# Patient Record
Sex: FEMALE | Race: WHITE | NOT HISPANIC OR LATINO | Employment: OTHER | URBAN - METROPOLITAN AREA
[De-identification: names, ages, dates, MRNs, and addresses within clinical notes are randomized per-mention and may not be internally consistent; named-entity substitution may affect disease eponyms.]

---

## 2017-11-30 ENCOUNTER — TRANSCRIBE ORDERS (OUTPATIENT)
Dept: ADMINISTRATIVE | Facility: HOSPITAL | Age: 67
End: 2017-11-30

## 2017-11-30 DIAGNOSIS — Z12.39 ENCOUNTER FOR SCREENING BREAST EXAMINATION: Primary | ICD-10-CM

## 2017-12-26 ENCOUNTER — APPOINTMENT (OUTPATIENT)
Dept: MAMMOGRAPHY | Facility: HOSPITAL | Age: 67
End: 2017-12-26

## 2018-02-01 ENCOUNTER — HOSPITAL ENCOUNTER (OUTPATIENT)
Dept: MAMMOGRAPHY | Facility: HOSPITAL | Age: 68
Discharge: HOME | End: 2018-02-01
Admitting: FAMILY MEDICINE

## 2018-02-01 DIAGNOSIS — Z12.39 ENCOUNTER FOR SCREENING BREAST EXAMINATION: ICD-10-CM

## 2018-02-01 PROCEDURE — 77067 SCR MAMMO BI INCL CAD: CPT

## 2018-03-05 ENCOUNTER — TRANSCRIBE ORDERS (OUTPATIENT)
Dept: ADMINISTRATIVE | Facility: HOSPITAL | Age: 68
End: 2018-03-05

## 2018-03-05 ENCOUNTER — HOSPITAL ENCOUNTER (OUTPATIENT)
Dept: GENERAL RADIOLOGY | Facility: HOSPITAL | Age: 68
Discharge: HOME | End: 2018-03-05
Admitting: FAMILY MEDICINE

## 2018-03-05 DIAGNOSIS — J69.0 ASPIRATION PNEUMONIA, UNSPECIFIED ASPIRATION PNEUMONIA TYPE, UNSPECIFIED LATERALITY, UNSPECIFIED PART OF LUNG (HCC): ICD-10-CM

## 2018-03-05 DIAGNOSIS — J69.0 ASPIRATION PNEUMONIA, UNSPECIFIED ASPIRATION PNEUMONIA TYPE, UNSPECIFIED LATERALITY, UNSPECIFIED PART OF LUNG (HCC): Primary | ICD-10-CM

## 2018-03-05 PROCEDURE — 71046 X-RAY EXAM CHEST 2 VIEWS: CPT

## 2018-08-22 ENCOUNTER — CONSULT (OUTPATIENT)
Dept: ONCOLOGY | Facility: CLINIC | Age: 68
End: 2018-08-22

## 2018-08-22 ENCOUNTER — LAB (OUTPATIENT)
Dept: LAB | Facility: HOSPITAL | Age: 68
End: 2018-08-22

## 2018-08-22 VITALS
RESPIRATION RATE: 16 BRPM | SYSTOLIC BLOOD PRESSURE: 138 MMHG | TEMPERATURE: 97.9 F | DIASTOLIC BLOOD PRESSURE: 70 MMHG | WEIGHT: 195.4 LBS | HEART RATE: 62 BPM | OXYGEN SATURATION: 96 % | BODY MASS INDEX: 32.55 KG/M2 | HEIGHT: 65 IN

## 2018-08-22 DIAGNOSIS — R79.89 ABNORMAL CBC: Primary | ICD-10-CM

## 2018-08-22 DIAGNOSIS — D64.9 ANEMIA, UNSPECIFIED TYPE: Primary | ICD-10-CM

## 2018-08-22 LAB
ALBUMIN SERPL-MCNC: 4 G/DL (ref 3.5–5.2)
ALBUMIN/GLOB SERPL: 1.3 G/DL (ref 1.1–2.4)
ALP SERPL-CCNC: 79 U/L (ref 38–116)
ALT SERPL W P-5'-P-CCNC: 9 U/L (ref 0–33)
ANION GAP SERPL CALCULATED.3IONS-SCNC: 13.2 MMOL/L
AST SERPL-CCNC: 16 U/L (ref 0–32)
BASOPHILS # BLD AUTO: 0.1 10*3/MM3 (ref 0–0.1)
BASOPHILS NFR BLD AUTO: 0.8 % (ref 0–1.1)
BILIRUB SERPL-MCNC: 0.2 MG/DL (ref 0.1–1.2)
BUN BLD-MCNC: 16 MG/DL (ref 6–20)
BUN/CREAT SERPL: 17.4 (ref 7.3–30)
CALCIUM SPEC-SCNC: 9.8 MG/DL (ref 8.5–10.2)
CHLORIDE SERPL-SCNC: 101 MMOL/L (ref 98–107)
CO2 SERPL-SCNC: 24.8 MMOL/L (ref 22–29)
CREAT BLD-MCNC: 0.92 MG/DL (ref 0.6–1.1)
DEPRECATED RDW RBC AUTO: 50.4 FL (ref 37–49)
EOSINOPHIL # BLD AUTO: 0.36 10*3/MM3 (ref 0–0.36)
EOSINOPHIL NFR BLD AUTO: 2.9 % (ref 1–5)
ERYTHROCYTE [DISTWIDTH] IN BLOOD BY AUTOMATED COUNT: 20.4 % (ref 11.7–14.5)
FERRITIN SERPL-MCNC: 7.1 NG/ML
FOLATE SERPL-MCNC: 19.43 NG/ML (ref 4.78–24.2)
GFR SERPL CREATININE-BSD FRML MDRD: 60 ML/MIN/1.73
GLOBULIN UR ELPH-MCNC: 3.2 GM/DL (ref 1.8–3.5)
GLUCOSE BLD-MCNC: 96 MG/DL (ref 74–124)
HAPTOGLOB SERPL-MCNC: 345 MG/DL (ref 30–200)
HCT VFR BLD AUTO: 29.7 % (ref 34–45)
HGB BLD-MCNC: 8.6 G/DL (ref 11.5–14.9)
IMM GRANULOCYTES # BLD: 0.07 10*3/MM3 (ref 0–0.03)
IMM GRANULOCYTES NFR BLD: 0.6 % (ref 0–0.5)
IRON 24H UR-MRATE: 14 MCG/DL (ref 37–145)
IRON SATN MFR SERPL: 3 % (ref 14–48)
LDH SERPL-CCNC: 178 U/L (ref 99–259)
LYMPHOCYTES # BLD AUTO: 1.93 10*3/MM3 (ref 1–3.5)
LYMPHOCYTES NFR BLD AUTO: 15.3 % (ref 20–49)
MCH RBC QN AUTO: 20 PG (ref 27–33)
MCHC RBC AUTO-ENTMCNC: 29 G/DL (ref 32–35)
MCV RBC AUTO: 69.2 FL (ref 83–97)
MONOCYTES # BLD AUTO: 1.03 10*3/MM3 (ref 0.25–0.8)
MONOCYTES NFR BLD AUTO: 8.2 % (ref 4–12)
NEUTROPHILS # BLD AUTO: 9.1 10*3/MM3 (ref 1.5–7)
NEUTROPHILS NFR BLD AUTO: 72.2 % (ref 39–75)
NRBC BLD MANUAL-RTO: 0 /100 WBC (ref 0–0)
PLATELET # BLD AUTO: 371 10*3/MM3 (ref 150–375)
PMV BLD AUTO: 9.2 FL (ref 8.9–12.1)
POTASSIUM BLD-SCNC: 4.5 MMOL/L (ref 3.5–4.7)
PROT SERPL-MCNC: 7.2 G/DL (ref 6.3–8)
RBC # BLD AUTO: 4.29 10*6/MM3 (ref 3.9–5)
SODIUM BLD-SCNC: 139 MMOL/L (ref 134–145)
TIBC SERPL-MCNC: 435 MCG/DL (ref 249–505)
TRANSFERRIN SERPL-MCNC: 311 MG/DL (ref 200–360)
VIT B12 BLD-MCNC: 966 PG/ML (ref 211–946)
WBC NRBC COR # BLD: 12.59 10*3/MM3 (ref 4–10)

## 2018-08-22 PROCEDURE — 80053 COMPREHEN METABOLIC PANEL: CPT | Performed by: INTERNAL MEDICINE

## 2018-08-22 PROCEDURE — 84466 ASSAY OF TRANSFERRIN: CPT | Performed by: INTERNAL MEDICINE

## 2018-08-22 PROCEDURE — 82607 VITAMIN B-12: CPT | Performed by: INTERNAL MEDICINE

## 2018-08-22 PROCEDURE — 83615 LACTATE (LD) (LDH) ENZYME: CPT | Performed by: INTERNAL MEDICINE

## 2018-08-22 PROCEDURE — 36415 COLL VENOUS BLD VENIPUNCTURE: CPT | Performed by: INTERNAL MEDICINE

## 2018-08-22 PROCEDURE — 83010 ASSAY OF HAPTOGLOBIN QUANT: CPT | Performed by: INTERNAL MEDICINE

## 2018-08-22 PROCEDURE — 82746 ASSAY OF FOLIC ACID SERUM: CPT | Performed by: INTERNAL MEDICINE

## 2018-08-22 PROCEDURE — 99205 OFFICE O/P NEW HI 60 MIN: CPT | Performed by: INTERNAL MEDICINE

## 2018-08-22 PROCEDURE — 83540 ASSAY OF IRON: CPT | Performed by: INTERNAL MEDICINE

## 2018-08-22 PROCEDURE — 85025 COMPLETE CBC W/AUTO DIFF WBC: CPT | Performed by: INTERNAL MEDICINE

## 2018-08-22 PROCEDURE — 82728 ASSAY OF FERRITIN: CPT | Performed by: INTERNAL MEDICINE

## 2018-08-22 RX ORDER — OMEPRAZOLE 20 MG/1
20 CAPSULE, DELAYED RELEASE ORAL DAILY
COMMUNITY

## 2018-08-22 RX ORDER — SUMATRIPTAN 50 MG/1
TABLET, FILM COATED ORAL
Refills: 5 | COMMUNITY
Start: 2018-08-17

## 2018-08-22 RX ORDER — FEXOFENADINE HCL 180 MG/1
180 TABLET ORAL DAILY
COMMUNITY

## 2018-08-22 RX ORDER — SENNOSIDES 8.6 MG
650 CAPSULE ORAL EVERY 8 HOURS PRN
COMMUNITY

## 2018-08-22 RX ORDER — ACETAMINOPHEN, ASPIRIN AND CAFFEINE 250; 250; 65 MG/1; MG/1; MG/1
1 TABLET, FILM COATED ORAL EVERY 6 HOURS PRN
COMMUNITY

## 2018-08-22 RX ORDER — DIPHENHYDRAMINE HCL 25 MG
25 CAPSULE ORAL EVERY 6 HOURS PRN
COMMUNITY

## 2018-08-22 RX ORDER — FLUTICASONE PROPIONATE 50 MCG
2 SPRAY, SUSPENSION (ML) NASAL DAILY
COMMUNITY

## 2018-08-22 RX ORDER — PHENYLEPHRINE HCL 10 MG/1
10 TABLET, FILM COATED ORAL EVERY 4 HOURS PRN
COMMUNITY

## 2018-08-22 NOTE — PROGRESS NOTES
.     REASON FOR CONSULTATION:     Provide an opinion on any further workup or treatment of leukocytosis.                              REQUESTING PHYSICIAN: Miriam Mercado Reyes, MD     RECORDS OBTAINED:  Records of the patients history including those obtained from the referring provider were reviewed and summarized in detail.    HISTORY OF PRESENT ILLNESS:  The patient is a 68 y.o. year old female who is here for initial evaluation because of leukocytosis, referred by her primary care physician, Dr. Bueno. Patient is accompanied by her  today. Her recent outside laboratory study from 07/31/2018 reported a total WBC of 12,400 including neutrophils 8965, lymphocytes 1885, monocytes 1180 and eosinophils 285. Her hemoglobin was 8.8, MCV 70.4, MCHC 27.4. Her platelets were 386,000. Chemistry lab reported creatinine 1.14, calcium 12.6 otherwise normal electrolytes. Total protein 6.9 and globulin 3.0. Normal liver function panel and normal glucose 97.     Patient reports that she has profound fatigue. Upon questioning, her  reports the patient chews ice chips all the time. Patient reports no melena or hematochezia. She has previous history of significant diarrhea and was seen by GI specialist for evaluation. She changed her diet to gluten free diet and diarrhea resolved after that. She does have chronic indigestion, abnormal bowel function with some constipation also. She denies melena or hematochezia. Patient has some weight gain but appears not significant in the past year. She also complains of some night sweats but not drenching night sweats. She complains of cold sensitivity. She wears glasses with poor vision but nothing really deteriorating significantly recently. She also complains of chronic joint pains and back pains. She has intermittent headaches with some dizziness. Her  reports patient has difficulty walking and unsteady gait mostly related to her psychiatric medication.     Her   also reports the patient takes a lot of calcium for acid reflux problem. This patient also has been taking alendronate for osteoporosis/osteopenia for at least more than 2 years. She also uses CBD oil because of arthralgia. She previously took pain medication for that.    Laboratory study today reported WBC 12,590 including neutrophils 9100, lymphocytes 1930, monocytes 1030, eosinophils 360. Hemoglobin was 8.6, MCV 69.2, MCHC 29.0, platelets 331,000.         Past Medical History:   Diagnosis Date   • Bipolar disorder (CMS/HCC)    • Bladder spasms    • H/O Heartburn    • History of anemia    • History of aspiration pneumonia 03/2018   • Rosacea    • Skin cancer    Celiac disease    Past Surgical History:   Procedure Laterality Date   • AUGMENTATION MAMMAPLASTY  2000   • BREAST BIOPSY Right 1979    benign   • BREAST SURGERY      Benign cyst   • TUBAL ABDOMINAL LIGATION     Foot surgery 20 some years ago.   Endometrial ablation 30 years ago.    HEMATOLOGIC/ONCOLOGIC HISTORY:  (History from previous dates can be found in the separate document.)  See history of present illness    MEDICATIONS    Current Outpatient Prescriptions:   •  acetaminophen (TYLENOL 8 HOUR) 650 MG 8 hr tablet, Take 650 mg by mouth Every 8 (Eight) Hours As Needed for Mild Pain ., Disp: , Rfl:   •  alendronate (FOSAMAX) 70 MG tablet, , Disp: , Rfl:   •  aspirin-acetaminophen-caffeine (EXCEDRIN MIGRAINE) 250-250-65 MG per tablet, Take 1 tablet by mouth Every 6 (Six) Hours As Needed for Headache., Disp: , Rfl:   •  CBD (cannabidiol) oral oil, Take 1,000 mL by mouth 2 (Two) Times a Day., Disp: , Rfl:   •  citalopram (CeleXA) 20 MG tablet, , Disp: , Rfl:   •  diphenhydrAMINE (BENADRYL) 25 mg capsule, Take 25 mg by mouth Every 6 (Six) Hours As Needed for Itching., Disp: , Rfl:   •  fexofenadine (ALLEGRA) 180 MG tablet, Take 180 mg by mouth Daily., Disp: , Rfl:   •  fluticasone (FLONASE) 50 MCG/ACT nasal spray, 2 sprays into the nostril(s) as  directed by provider Daily., Disp: , Rfl:   •  furosemide (LASIX) 20 MG tablet, , Disp: , Rfl:   •  lamoTRIgine (LaMICtal) 200 MG tablet, , Disp: , Rfl:   •  lovastatin (MEVACOR) 20 MG tablet, , Disp: , Rfl:   •  metoprolol tartrate (LOPRESSOR) 50 MG tablet, , Disp: , Rfl:   •  minocycline (MINOCIN,DYNACIN) 100 MG capsule, , Disp: , Rfl:   •  Multiple Vitamins-Minerals (MULTIVITAMIN ADULT PO), Take  by mouth., Disp: , Rfl:   •  omeprazole (priLOSEC) 20 MG capsule, Take 20 mg by mouth Daily., Disp: , Rfl:   •  oxybutynin (DITROPAN) 5 MG tablet, , Disp: , Rfl:   •  phenylephrine (SUDAFED PE) 10 MG tablet, Take 10 mg by mouth Every 4 (Four) Hours As Needed for Congestion., Disp: , Rfl:   •  risperiDONE (RisperDAL) 2 MG tablet, , Disp: , Rfl:   •  SUMAtriptan (IMITREX) 50 MG tablet, AS DIRECTED IF NEEDED XFTPXPE FD, Disp: , Rfl: 5  •  baclofen (LIORESAL) 10 MG tablet, , Disp: , Rfl:   •  HYDROcodone-acetaminophen (NORCO) 5-325 MG per tablet, , Disp: , Rfl:     ALLERGIES:     Allergies   Allergen Reactions   • Gluten Meal Unknown (See Comments)     unknown       SOCIAL HISTORY:       Social History     Social History   • Marital status:      Spouse name: Hesham   • Number of children: 1    • Years of education: High school education.       Occupational History   • .   Retired     Social History Main Topics   • Smoking status: Former Smoker half pack a day for 25 years,      Types: Cigarettes     Quit date: 2010   • Smokeless tobacco: Never Used   • Alcohol use Yes      Comment: Less than 3 beers per week    • Drug use: None    • Sexual activity: Not on file         FAMILY HISTORY:  Father had mental illness, was diagnosed of lymphoma at age 67,  the same year.  Mother was diagnosed (grade cancer at age of 81,  of same year.  Her mother also had heart disease and diabetes.  Maternal grandmother diagnosed of breast cancer in her 90s.      REVIEW OF SYSTEMS:  Review of Systems   Constitutional:  "Positive for fatigue (Profound fatigue.  Positive for Pica for ice chips). Negative for appetite change, diaphoresis, fever and unexpected weight change.   HENT: Negative for congestion, mouth sores, nosebleeds and sore throat.    Eyes: Positive for visual disturbance (Poor vision with glasses).   Respiratory: Negative for cough and shortness of breath.    Cardiovascular: Positive for leg swelling (Ankle swelling). Negative for chest pain and palpitations.   Gastrointestinal: Positive for constipation and diarrhea. Negative for abdominal pain, blood in stool, nausea and vomiting.        Has chronic indigestion   Endocrine: Positive for cold intolerance.   Genitourinary: Negative for dysuria, frequency and hematuria.   Musculoskeletal: Positive for arthralgias and back pain.   Skin: Negative for color change.   Neurological: Positive for dizziness, weakness (Difficulty walking unsteady gait due to psychiatric medication) and headaches. Negative for numbness.   Hematological: Negative for adenopathy. Does not bruise/bleed easily.   Psychiatric/Behavioral: Negative for confusion.              Vitals:    08/22/18 1422   BP: 138/70   Pulse: 62   Resp: 16   Temp: 97.9 °F (36.6 °C)   SpO2: 96%   Weight: 88.6 kg (195 lb 6.4 oz)   Height: 166 cm (65.35\")  Comment: new w/shoes   PainSc:   7   PainLoc: Abdomen  Comment: all over joint pain elbows hips back shoulders     Current Status 8/22/2018   ECOG score 1         PHYSICAL EXAM:      CONSTITUTIONAL:  Well-developed well-nourished female.  No distress, looks comfortable.  She uses a walker because of blanks daily gait from psychotic medication.  EYES:  Conjunctiva and lids unremarkable.  PERRLA  EARS,NOSE,MOUTH,THROAT:  Ears and nose appear unremarkable.  Oral mucosa moist.  Lips appear unremarkable.  RESPIRATORY:  Normal respiratory effort.  Lungs clear to auscultation bilaterally.  CARDIOVASCULAR: Regular rhythm and rate.  Normal S1, S2.  No murmurs rubs or gallops.  No " lower extremity edema.  GASTROINTESTINAL: Abdomen appears unremarkable.  Nontender.  No hepatomegaly.  No splenomegaly.  Bowel sounds normal.  LYMPHATIC:  No cervical, supraclavicular, axillary lymphadenopathy.  MUSCULOSKELETAL:  Patient uses a walker for unsteady gait.   Unremarkable digits/nails.  No cyanosis or clubbing.  SKIN:  Warm.  No rashes.  PSYCHIATRIC:  Normal judgment and insight.  Normal mood and affect.      RECENT LABS:  Lab Results   Component Value Date    WBC 12.59 (H) 08/22/2018    HGB 8.6 (L) 08/22/2018    HCT 29.7 (L) 08/22/2018    MCV 69.2 (L) 08/22/2018     08/22/2018     Lab Results   Component Value Date    NEUTROABS 9.10 (H) 08/22/2018     Lab Results   Component Value Date    GLUCOSE 96 08/22/2018    BUN 16 08/22/2018    CREATININE 0.92 08/22/2018    EGFRIFNONA 60 (L) 08/22/2018    BCR 17.4 08/22/2018    K 4.5 08/22/2018    CO2 24.8 08/22/2018    CALCIUM 9.8 08/22/2018    ALBUMIN 4.00 08/22/2018    AST 16 08/22/2018    ALT 9 08/22/2018     Sodium   Date Value Ref Range Status   08/22/2018 139 134 - 145 mmol/L Final     Potassium   Date Value Ref Range Status   08/22/2018 4.5 3.5 - 4.7 mmol/L Final     Total Bilirubin   Date Value Ref Range Status   08/22/2018 0.2 0.1 - 1.2 mg/dL Final     Alkaline Phosphatase   Date Value Ref Range Status   08/22/2018 79 38 - 116 U/L Final   ]  Review of peripheral blood smear showed significant abnormalities including significant target cells, hypochromia. No schistocytes. There was also adair-shaped RBCs. There are increased granulocytes, however, morphology wise appears normal. No evidence of hematologic malignancy. Platelet morphology is also normal.     Assessment/Plan      ASSESSMENT:   1. Moderate-to-severe anemia, hypochromic and microcytic. Review of peripheral blood smear shows significant hypochromia and target cells as well as adair-shaped RBCs. No schistocytes. This patient is highly suspicious for iron deficiency based on the laboratory  evaluation; however, there is no confirmatory studies. I recommended obtain iron studies including iron profile, ferritin, together with vitamin B12, folic acid and also LDH, haptoglobin and erythropoietin level for evaluation. This patient had CMP on 07/31/2018, which had mildly elevated creatinine level which put her at Stage 3 renal insufficiency. Indeed this patient may have multifactorial anemia. Hemolysis should also be evaluated as a possibility.     2. Hypercalcemia. This patient had elevated calcium level 12.1 on 07/31/2018. According to her  this patient has been taking a large amount of antacid, TUMS. I will repeat CMP for evaluation.     3.  Mild leukocytosis.  Physical examination unremarkable.  Patient has no signs of infection.  Review of peripheral peripheral blood smear should in no evidence of hematology malignancy.  I recommended no further laboratory study for this matter.  We'll continue to monitor.       PLAN:     1.  Lab studies today:   - Ferritin  - Iron Profile  - Vitamin B12  - Folate  - Comprehensive Metabolic Panel  - Lactate Dehydrogenase  - Erythropoietin  - Immunofixation, Serum  - Haptoglobin    2. I will call patient to discuss results in the next day or 2 when available. I explained to her the possibility for iron deficiency, B12, folic acid deficiency. I asked her to take oral iron if needed too. I will bring her back in 4 weeks for reevaluation at that time.      ENRIQUE WHYTE M.D., Ph.D.    8/22/2018    Addendum:   Lab Results   Component Value Date    IRON 14 (L) 08/22/2018    TIBC 435 08/22/2018    FERRITIN 7.10 08/22/2018     Lab Results   Component Value Date    FOLATE 19.43 08/22/2018     Lab Results   Component Value Date    DDLWAICZ25 966 (H) 08/22/2018     Lab Results   Component Value Date    GLUCOSE 96 08/22/2018    BUN 16 08/22/2018    CREATININE 0.92 08/22/2018    EGFRIFNONA 60 (L) 08/22/2018    BCR 17.4 08/22/2018    K 4.5 08/22/2018    CO2 24.8 08/22/2018     CALCIUM 9.8 08/22/2018    ALBUMIN 4.00 08/22/2018    AST 16 08/22/2018    ALT 9 08/22/2018     Sodium   Date Value Ref Range Status   08/22/2018 139 134 - 145 mmol/L Final     Potassium   Date Value Ref Range Status   08/22/2018 4.5 3.5 - 4.7 mmol/L Final     Total Bilirubin   Date Value Ref Range Status   08/22/2018 0.2 0.1 - 1.2 mg/dL Final     Alkaline Phosphatase   Date Value Ref Range Status   08/22/2018 79 38 - 116 U/L Final     , haptoglobin 345    She has significant iron deficiency by laboratory study.  Laboratory study showed no evidence of hemolysis.  Her calcium level was normalized.  There is no deficiency of vitamin B12 or folic acid.    I called and left a message to patient on 8/23/2018 asking her to start oral iron treatment with ferrous sulfate 325 mg 3 times a day.          ENRIQUE WHYTE M.D., Ph.D.    8/23/2018      CC: Miriam Mercado Reyes, MD        Dictated using Dragon Dictation.

## 2018-08-23 LAB
ETHNIC BACKGROUND STATED: 110.7 MIU/ML (ref 2.6–18.5)
IGA SERPL-MCNC: 242 MG/DL (ref 87–352)
IGG SERPL-MCNC: 880 MG/DL (ref 700–1600)
IGM SERPL-MCNC: 50 MG/DL (ref 26–217)
PROT PATTERN SERPL IFE-IMP: NORMAL

## 2018-08-31 ENCOUNTER — LAB (OUTPATIENT)
Dept: LAB | Facility: HOSPITAL | Age: 68
End: 2018-08-31

## 2018-08-31 DIAGNOSIS — D64.9 ANEMIA, UNSPECIFIED TYPE: Primary | ICD-10-CM

## 2018-08-31 LAB
COLLECT DATE SP2 STL: NORMAL
COLLECT DATE SP3 STL: NORMAL
COLLECT DATE STL: NORMAL
HEMOCCULT STL QL: NEGATIVE
Lab: NORMAL

## 2018-08-31 PROCEDURE — 82270 OCCULT BLOOD FECES: CPT

## 2018-09-19 ENCOUNTER — OFFICE VISIT (OUTPATIENT)
Dept: ONCOLOGY | Facility: CLINIC | Age: 68
End: 2018-09-19

## 2018-09-19 ENCOUNTER — APPOINTMENT (OUTPATIENT)
Dept: LAB | Facility: HOSPITAL | Age: 68
End: 2018-09-19

## 2018-09-19 VITALS
HEART RATE: 67 BPM | WEIGHT: 193 LBS | SYSTOLIC BLOOD PRESSURE: 122 MMHG | TEMPERATURE: 98.6 F | BODY MASS INDEX: 32.15 KG/M2 | RESPIRATION RATE: 16 BRPM | OXYGEN SATURATION: 96 % | DIASTOLIC BLOOD PRESSURE: 70 MMHG | HEIGHT: 65 IN

## 2018-09-19 DIAGNOSIS — D72.829 LEUKOCYTOSIS, UNSPECIFIED TYPE: ICD-10-CM

## 2018-09-19 DIAGNOSIS — D64.9 ANEMIA, UNSPECIFIED TYPE: Primary | ICD-10-CM

## 2018-09-19 DIAGNOSIS — D50.9 IRON DEFICIENCY ANEMIA, UNSPECIFIED IRON DEFICIENCY ANEMIA TYPE: Primary | ICD-10-CM

## 2018-09-19 LAB
BASOPHILS # BLD AUTO: 0.06 10*3/MM3 (ref 0–0.1)
BASOPHILS NFR BLD AUTO: 0.6 % (ref 0–1.1)
EOSINOPHIL # BLD AUTO: 0.15 10*3/MM3 (ref 0–0.36)
EOSINOPHIL NFR BLD AUTO: 1.6 % (ref 1–5)
ERYTHROCYTE [DISTWIDTH] IN BLOOD BY AUTOMATED COUNT: ABNORMAL % (ref 11.7–14.5)
HCT VFR BLD AUTO: 40.6 % (ref 34–45)
HGB BLD-MCNC: 12.3 G/DL (ref 11.5–14.9)
IMM GRANULOCYTES # BLD: 0.04 10*3/MM3 (ref 0–0.03)
IMM GRANULOCYTES NFR BLD: 0.4 % (ref 0–0.5)
LYMPHOCYTES # BLD AUTO: 1.71 10*3/MM3 (ref 1–3.5)
LYMPHOCYTES NFR BLD AUTO: 17.7 % (ref 20–49)
MCH RBC QN AUTO: 24.3 PG (ref 27–33)
MCHC RBC AUTO-ENTMCNC: 30.3 G/DL (ref 32–35)
MCV RBC AUTO: 80.2 FL (ref 83–97)
MONOCYTES # BLD AUTO: 0.8 10*3/MM3 (ref 0.25–0.8)
MONOCYTES NFR BLD AUTO: 8.3 % (ref 4–12)
NEUTROPHILS # BLD AUTO: 6.9 10*3/MM3 (ref 1.5–7)
NEUTROPHILS NFR BLD AUTO: 71.4 % (ref 39–75)
NRBC BLD MANUAL-RTO: 0 /100 WBC (ref 0–0)
PLATELET # BLD AUTO: 303 10*3/MM3 (ref 150–375)
PMV BLD AUTO: 9.7 FL (ref 8.9–12.1)
RBC # BLD AUTO: 5.06 10*6/MM3 (ref 3.9–5)
WBC NRBC COR # BLD: 9.66 10*3/MM3 (ref 4–10)

## 2018-09-19 PROCEDURE — 99213 OFFICE O/P EST LOW 20 MIN: CPT | Performed by: INTERNAL MEDICINE

## 2018-09-19 PROCEDURE — 85025 COMPLETE CBC W/AUTO DIFF WBC: CPT | Performed by: INTERNAL MEDICINE

## 2018-09-19 PROCEDURE — 36416 COLLJ CAPILLARY BLOOD SPEC: CPT | Performed by: INTERNAL MEDICINE

## 2018-09-19 RX ORDER — FERROUS SULFATE 325(65) MG
325 TABLET ORAL 2 TIMES DAILY
COMMUNITY

## 2018-10-01 PROBLEM — D72.829 LEUKOCYTOSIS: Status: ACTIVE | Noted: 2018-10-01

## 2019-01-07 ENCOUNTER — TRANSCRIBE ORDERS (OUTPATIENT)
Dept: ADMINISTRATIVE | Facility: HOSPITAL | Age: 69
End: 2019-01-07

## 2019-01-07 DIAGNOSIS — Z12.31 VISIT FOR SCREENING MAMMOGRAM: Primary | ICD-10-CM

## 2019-02-08 ENCOUNTER — HOSPITAL ENCOUNTER (OUTPATIENT)
Dept: MAMMOGRAPHY | Facility: HOSPITAL | Age: 69
Discharge: HOME | End: 2019-02-08
Admitting: FAMILY MEDICINE

## 2019-02-08 DIAGNOSIS — Z12.31 VISIT FOR SCREENING MAMMOGRAM: ICD-10-CM

## 2019-02-08 PROCEDURE — 77063 BREAST TOMOSYNTHESIS BI: CPT

## 2019-02-08 PROCEDURE — 77067 SCR MAMMO BI INCL CAD: CPT

## 2019-03-20 ENCOUNTER — OFFICE VISIT (OUTPATIENT)
Dept: ONCOLOGY | Facility: CLINIC | Age: 69
End: 2019-03-20

## 2019-03-20 ENCOUNTER — LAB (OUTPATIENT)
Dept: LAB | Facility: HOSPITAL | Age: 69
End: 2019-03-20

## 2019-03-20 VITALS
WEIGHT: 198.9 LBS | HEART RATE: 69 BPM | SYSTOLIC BLOOD PRESSURE: 145 MMHG | TEMPERATURE: 98.1 F | OXYGEN SATURATION: 94 % | DIASTOLIC BLOOD PRESSURE: 75 MMHG | HEIGHT: 65 IN | BODY MASS INDEX: 33.14 KG/M2 | RESPIRATION RATE: 16 BRPM

## 2019-03-20 DIAGNOSIS — D50.8 OTHER IRON DEFICIENCY ANEMIA: Primary | ICD-10-CM

## 2019-03-20 DIAGNOSIS — D50.9 IRON DEFICIENCY ANEMIA, UNSPECIFIED IRON DEFICIENCY ANEMIA TYPE: Primary | ICD-10-CM

## 2019-03-20 DIAGNOSIS — T45.4X5S ADVERSE EFFECT OF PREPARATION OF IRON COMPOUND, SEQUELA: ICD-10-CM

## 2019-03-20 PROBLEM — T45.4X5A ADVERSE EFFECT OF PREPARATION OF IRON COMPOUND: Status: ACTIVE | Noted: 2019-03-20

## 2019-03-20 LAB
BASOPHILS # BLD AUTO: 0.06 10*3/MM3 (ref 0–0.2)
BASOPHILS NFR BLD AUTO: 0.8 % (ref 0–1.5)
DEPRECATED RDW RBC AUTO: 46.9 FL (ref 37–54)
EOSINOPHIL # BLD AUTO: 0.15 10*3/MM3 (ref 0–0.4)
EOSINOPHIL NFR BLD AUTO: 2.1 % (ref 0.3–6.2)
ERYTHROCYTE [DISTWIDTH] IN BLOOD BY AUTOMATED COUNT: 13.7 % (ref 12.3–15.4)
FERRITIN SERPL-MCNC: 125.6 NG/ML (ref 13–150)
HCT VFR BLD AUTO: 45.3 % (ref 34–46.6)
HGB BLD-MCNC: 14.5 G/DL (ref 12–15.9)
IMM GRANULOCYTES # BLD AUTO: 0.02 10*3/MM3 (ref 0–0.05)
IMM GRANULOCYTES NFR BLD AUTO: 0.3 % (ref 0–0.5)
IRON 24H UR-MRATE: 107 MCG/DL (ref 37–145)
IRON 24H UR-MRATE: 110 MCG/DL (ref 37–145)
IRON SATN MFR SERPL: 29 % (ref 20–50)
LYMPHOCYTES # BLD AUTO: 1.48 10*3/MM3 (ref 0.7–3.1)
LYMPHOCYTES NFR BLD AUTO: 20.8 % (ref 19.6–45.3)
MCH RBC QN AUTO: 29.4 PG (ref 26.6–33)
MCHC RBC AUTO-ENTMCNC: 32 G/DL (ref 31.5–35.7)
MCV RBC AUTO: 91.7 FL (ref 79–97)
MONOCYTES # BLD AUTO: 0.6 10*3/MM3 (ref 0.1–0.9)
MONOCYTES NFR BLD AUTO: 8.4 % (ref 5–12)
NEUTROPHILS # BLD AUTO: 4.8 10*3/MM3 (ref 1.4–7)
NEUTROPHILS NFR BLD AUTO: 67.6 % (ref 42.7–76)
NRBC BLD AUTO-RTO: 0 /100 WBC (ref 0–0)
PLATELET # BLD AUTO: 229 10*3/MM3 (ref 140–450)
PMV BLD AUTO: 10.4 FL (ref 6–12)
RBC # BLD AUTO: 4.94 10*6/MM3 (ref 3.77–5.28)
TIBC SERPL-MCNC: 370 MCG/DL (ref 298–536)
TRANSFERRIN SERPL-MCNC: 248 MG/DL (ref 200–360)
WBC NRBC COR # BLD: 7.11 10*3/MM3 (ref 3.4–10.8)

## 2019-03-20 PROCEDURE — 83540 ASSAY OF IRON: CPT | Performed by: INTERNAL MEDICINE

## 2019-03-20 PROCEDURE — 99213 OFFICE O/P EST LOW 20 MIN: CPT | Performed by: INTERNAL MEDICINE

## 2019-03-20 PROCEDURE — 82728 ASSAY OF FERRITIN: CPT | Performed by: INTERNAL MEDICINE

## 2019-03-20 PROCEDURE — 85025 COMPLETE CBC W/AUTO DIFF WBC: CPT | Performed by: INTERNAL MEDICINE

## 2019-03-20 PROCEDURE — 84466 ASSAY OF TRANSFERRIN: CPT | Performed by: INTERNAL MEDICINE

## 2019-03-20 PROCEDURE — 36415 COLL VENOUS BLD VENIPUNCTURE: CPT | Performed by: INTERNAL MEDICINE

## 2019-03-20 NOTE — PROGRESS NOTES
"    .     REASON FOR CONSULTATION:     1.  Mild leukocytosis in August 2018.  This resolved in September 2018.   2.  Iron deficiency anemia diagnosed in August 2018, she was started on oral iron treatment.                             HISTORY OF PRESENT ILLNESS:  The patient is a 68 y.o. year old female who is here for 6-month reevaluation.  She is accompanied by her  today.     Since she was diagnosed of severe iron deficiency in late August 2018, patient has been taking oral iron treatment.  She reports taking oral iron twice a day for the past 6 months after original 3 times a day for about 1 month, it causes her having diarrhea.  But otherwise she is doing better, she reports resolution of pica for ice chips about 4 weeks into the oral iron treatment.  She reports resolution of dizziness and cold intolerance.  She is more energetic.    Her  also reports patient now eats meat after she was diagnosed of severe iron deficiency, she was a vegetarian previously.    Today laboratory studies showed further improved hemoglobin at 14.5, MCV 91.7.  She maintains normal WBC 7100 including ANC 4800 and the lymphocytes 1500, and normal platelets 229,000.  She has excellent iron panel including ferritin 125.6 ng/mL and free iron 110 mcg/dL.        Past Medical History:   Diagnosis Date   • Arthritis    • Bipolar disorder (CMS/HCC)    • Bladder spasms    • H/O Heartburn    • History of anemia    • History of aspiration pneumonia 03/2018   • Rosacea    • Skin cancer    • Tattoos    Celiac disease    Past Surgical History:   Procedure Laterality Date   • AUGMENTATION MAMMAPLASTY  2000   • BREAST EXCISIONAL BIOPSY Right 1979    benign   • BREAST LUMPECTOMY      Over 40 years ago.   • BREAST SURGERY      Benign cyst   • FOOT SURGERY      Over 20 years ago.   • OTHER SURGICAL HISTORY      \"Surgery for stopping menstruation\" over 30 years ago.   • TUBAL ABDOMINAL LIGATION      Over 40 years ago.   Foot surgery 20 some " years ago.   Endometrial ablation 30 years ago.    HEMATOLOGIC/ONCOLOGIC HISTORY: The patient was a 68 y.o. year old female who presented on 8/22/2018 for initial evaluation because of leukocytosis, referred by her primary care physician, Dr. Bueno. Patient was accompanied by her .     Her recent outside laboratory study from 07/31/2018 reported a total WBC of 12,400 including neutrophils 8965, lymphocytes 1885, monocytes 1180 and eosinophils 285. Her hemoglobin was 8.8, MCV 70.4, MCHC 27.4. Her platelets were 386,000. Chemistry lab reported creatinine 1.14, calcium 12.6 otherwise normal electrolytes. Total protein 6.9 and globulin 3.0. Normal liver function panel and normal glucose 97.     Patient reports that she has profound fatigue. Upon questioning, her  reports the patient chews ice chips all the time. Patient reports no melena or hematochezia. She has previous history of significant diarrhea and was seen by GI specialist for evaluation. She changed her diet to gluten free diet and diarrhea resolved after that. She does have chronic indigestion, abnormal bowel function with some constipation also. She denies melena or hematochezia. Patient has some weight gain but appears not significant in the past year. She also complains of some night sweats but not drenching night sweats. She complains of cold sensitivity. She wears glasses with poor vision but nothing really deteriorating significantly recently. She also complains of chronic joint pains and back pains. She has intermittent headaches with some dizziness. Her  reports patient has difficulty walking and unsteady gait mostly related to her psychiatric medication.     Her  also reports the patient takes a lot of calcium for acid reflux problem. This patient also has been taking alendronate for osteoporosis/osteopenia for at least more than 2 years. She also uses CBD oil because of arthralgia. She previously took pain medication for  that.    Laboratory study on 8/22/2018 reported WBC 12,590 including neutrophils 9100, lymphocytes 1930, monocytes 1030, eosinophils 360. Hemoglobin was 8.6, MCV 69.2, MCHC 29.0, platelets 331,000.  She had a significantly elevated serum erythropoietin level 110.7 mIU/mL. Further laboratory study reported a ferritin 7.1 ng/mL, iron 14, TIBC 435 and iron saturation 3%.  Folic acid was 19.4 ng/mL, B12 at 966 pg/mL, haptoglobin 345 mg/dL and negative for serum monoclonal proteins, normal serum IgG 880, IgA 242 and IgM 50 mg/dL.   CMP reported a creatinine 0.92, normal electrolytes glucose, total protein 7.2 albumin 4.0 and normal liver function panel.    Peripheral blood smear showed significant abnormalities including significant target cells, hypochromia. No schistocytes. There was also adair-shaped RBCs. There are increased granulocytes, however, morphology wise appears normal. No evidence of hematologic malignancy. Platelet morphology is also normal.     Patient was started on oral iron ferrous sulfate 325 mg 3 times a day in late August 2018.    Laboratory study on 9/19/2018 reported completely normalized hemoglobin 12.3, almost 4 g better.  She also has significantly improved MCV value at 80.2.     MEDICATIONS    Current Outpatient Medications:   •  alendronate (FOSAMAX) 70 MG tablet, , Disp: , Rfl:   •  aspirin-acetaminophen-caffeine (EXCEDRIN MIGRAINE) 250-250-65 MG per tablet, Take 1 tablet by mouth Every 6 (Six) Hours As Needed for Headache., Disp: , Rfl:   •  CBD (cannabidiol) oral oil, Take 1,000 mL by mouth As Needed., Disp: , Rfl:   •  citalopram (CeleXA) 20 MG tablet, , Disp: , Rfl:   •  ferrous sulfate 325 (65 FE) MG tablet, Take 325 mg by mouth 2 (Two) Times a Day., Disp: , Rfl:   •  fexofenadine (ALLEGRA) 180 MG tablet, Take 180 mg by mouth Daily., Disp: , Rfl:   •  furosemide (LASIX) 20 MG tablet, , Disp: , Rfl:   •  lamoTRIgine (LaMICtal) 200 MG tablet, , Disp: , Rfl:   •  lovastatin (MEVACOR) 20 MG  tablet, , Disp: , Rfl:   •  metoprolol tartrate (LOPRESSOR) 50 MG tablet, , Disp: , Rfl:   •  minocycline (MINOCIN,DYNACIN) 100 MG capsule, , Disp: , Rfl:   •  Multiple Vitamins-Minerals (MULTIVITAMIN ADULT PO), Take  by mouth., Disp: , Rfl:   •  omeprazole (priLOSEC) 20 MG capsule, Take 20 mg by mouth Daily., Disp: , Rfl:   •  oxybutynin (DITROPAN) 5 MG tablet, , Disp: , Rfl:   •  risperiDONE (RisperDAL) 2 MG tablet, , Disp: , Rfl:   •  SUMAtriptan (IMITREX) 50 MG tablet, AS DIRECTED IF NEEDED XFTPXPE FD, Disp: , Rfl: 5  •  acetaminophen (TYLENOL 8 HOUR) 650 MG 8 hr tablet, Take 650 mg by mouth Every 8 (Eight) Hours As Needed for Mild Pain ., Disp: , Rfl:   •  baclofen (LIORESAL) 10 MG tablet, , Disp: , Rfl:   •  diphenhydrAMINE (BENADRYL) 25 mg capsule, Take 25 mg by mouth Every 6 (Six) Hours As Needed for Itching., Disp: , Rfl:   •  fluticasone (FLONASE) 50 MCG/ACT nasal spray, 2 sprays into the nostril(s) as directed by provider Daily., Disp: , Rfl:   •  HYDROcodone-acetaminophen (NORCO) 5-325 MG per tablet, , Disp: , Rfl:   •  phenylephrine (SUDAFED PE) 10 MG tablet, Take 10 mg by mouth Every 4 (Four) Hours As Needed for Congestion., Disp: , Rfl:     ALLERGIES:     Allergies   Allergen Reactions   • Gluten Meal Unknown (See Comments)     unknown       SOCIAL HISTORY:       Social History     Social History   • Marital status:      Spouse name: Hesham   • Number of children: 1    • Years of education: High school education.       Occupational History   • .   Retired     Social History Main Topics   • Smoking status: Former Smoker half pack a day for 25 years,      Types: Cigarettes     Quit date: 2010   • Smokeless tobacco: Never Used   • Alcohol use Yes      Comment: Less than 3 beers per week    • Drug use: None    • Sexual activity: Not on file         FAMILY HISTORY:  Father had mental illness, was diagnosed of lymphoma at age 67,  the same year.  Mother was diagnosed (grade cancer  "at age of 81,  of same year.  Her mother also had heart disease and diabetes.  Maternal grandmother diagnosed of breast cancer in her 90s.      REVIEW OF SYSTEMS:  Review of Systems   Constitutional: Negative for appetite change, diaphoresis, fever and unexpected weight change. Fatigue: This has improved since oral iron treatment.  Also improved Pica for ice chips.   HENT: Negative for congestion, mouth sores and nosebleeds.    Eyes: Positive for visual disturbance (Poor vision with glasses).   Respiratory: Negative for cough and shortness of breath.    Cardiovascular: Positive for leg swelling (Ankle swelling). Negative for chest pain and palpitations.   Gastrointestinal: Positive for diarrhea (Secondary to oral iron treatment). Negative for abdominal pain, blood in stool, constipation and nausea.        Has chronic indigestion   Endocrine: Negative for cold intolerance.   Genitourinary: Negative for frequency and hematuria.   Musculoskeletal: Positive for arthralgias and back pain.   Skin: Negative for color change.   Neurological: Negative for dizziness, weakness (Difficulty walking unsteady gait due to psychiatric medication) and numbness.   Hematological: Negative for adenopathy. Does not bruise/bleed easily.   Psychiatric/Behavioral: Negative for confusion.              Vitals:    19 1447   BP: 145/75   Pulse: 69   Resp: 16   Temp: 98.1 °F (36.7 °C)   SpO2: 94%  Comment: artifical nails   Weight: 90.2 kg (198 lb 14.4 oz)   Height: 166 cm (65.35\")   PainSc: 0-No pain     ECOG 2.     PHYSICAL EXAM:      CONSTITUTIONAL:  Well-developed well-nourished female.  No distress, looks comfortable.    HEENT:  Conjunctiva and lids unremarkable.  nose appear unremarkable.  Lips appear unremarkable.  RESPIRATORY:  Normal respiratory effort.  Lungs clear to auscultation bilaterally.  CARDIOVASCULAR: Regular rhythm and rate.  Normal S1, S2.  No murmurs.    GASTROINTESTINAL: Abdomen no tender.  Bowel sounds " normal.  LYMPHATIC:  No cervical lymphadenopathy.  MUSCULOSKELETAL:  Unremarkable digits/nails.  No cyanosis or clubbing.  SKIN:  Warm.  No rashes.  PSYCHIATRIC:  Normal judgment and insight.  Normal mood and affect.      RECENT LABS:  Lab Results   Component Value Date    WBC 7.11 03/20/2019    HGB 14.5 03/20/2019    HCT 45.3 03/20/2019    MCV 91.7 03/20/2019     03/20/2019     Lab Results   Component Value Date    NEUTROABS 4.80 03/20/2019     Lab Results   Component Value Date    ZFBLVBTZ08 966 (H) 08/22/2018     Lab Results   Component Value Date    FOLATE 19.43 08/22/2018     Lab Results   Component Value Date    IRON 110 03/20/2019    TIBC 435 08/22/2018    FERRITIN 125.60 03/20/2019         Assessment/Plan       1.  Severe iron deficiency, with moderate-to-severe anemia, hypochromic and microcytic diagnosed on 8/22/2018.  There was no evidence of hemolysis.  Since that time she was taking oral iron ferrous sulfate 3 times a day with good tolerance.  She has significant improved energy level, with resolution of pica for ice chips and cold sensitivity.      Now 7 months after starting oral iron treatment, she has significant response with excellent iron panel including ferritin 125.6 ng/mL, and a free iron 110.  She also had excellent hemoglobin now at 14.5 with MCV 91.7.  She does report diarrhea secondary to oral iron treatment, which is a less common side effects from oral iron treatment.     Because patient has excellent response, and now she also has more balanced diet, she eats meat instead of being a vegetarian, I think she should be able to maintain her iron status through her diet.  So I recommended her to discontinue oral iron treatment because the side effects.     If in the future she has recurrent iron deficiency, most likely she would have pica for ice chips as she had previously.  I would be happy to see her again for reevaluation.      2.  Mild leukocytosis on 8/22/2018.  Physical  examination was unremarkable.  Patient has no signs of infection.      She had a complete normalized WBC in September 2018 and also today.      PLAN:     1.  Discontinue oral ferrous sulfate.    2.  Continue to eat balanced diet.  3.  We will not schedule patient for routine follow-up with us.  Patient will follow up with her primary care physician Dr. Reyes.    I discussed with the patient and her .  Questions were answered to their satisfaction.      ENRIQUE WHYTE M.D., Ph.D.    3/20/2019.       CC: Miriam M. Reyes, MD        Dictated using Dragon Dictation.

## 2019-10-07 ENCOUNTER — TRANSCRIBE ORDERS (OUTPATIENT)
Dept: ADMINISTRATIVE | Facility: HOSPITAL | Age: 69
End: 2019-10-07

## 2019-10-07 DIAGNOSIS — N17.9 ACUTE RENAL FAILURE, UNSPECIFIED ACUTE RENAL FAILURE TYPE (HCC): Primary | ICD-10-CM

## 2019-12-09 ENCOUNTER — LAB (OUTPATIENT)
Dept: LAB | Facility: HOSPITAL | Age: 69
End: 2019-12-09

## 2019-12-09 ENCOUNTER — TRANSCRIBE ORDERS (OUTPATIENT)
Dept: ADMINISTRATIVE | Facility: HOSPITAL | Age: 69
End: 2019-12-09

## 2019-12-09 ENCOUNTER — HOSPITAL ENCOUNTER (OUTPATIENT)
Dept: ULTRASOUND IMAGING | Facility: HOSPITAL | Age: 69
Discharge: HOME | End: 2019-12-09
Admitting: INTERNAL MEDICINE

## 2019-12-09 DIAGNOSIS — N17.9 ACUTE RENAL FAILURE, UNSPECIFIED ACUTE RENAL FAILURE TYPE (HCC): ICD-10-CM

## 2019-12-09 DIAGNOSIS — N17.9 ACUTE RENAL FAILURE, UNSPECIFIED ACUTE RENAL FAILURE TYPE (HCC): Primary | ICD-10-CM

## 2019-12-09 LAB
ANION GAP SERPL CALCULATED.3IONS-SCNC: 14.2 MMOL/L (ref 5–15)
BACTERIA UR QL AUTO: ABNORMAL /HPF
BASOPHILS # BLD AUTO: 0.04 10*3/MM3 (ref 0–0.2)
BASOPHILS NFR BLD AUTO: 0.5 % (ref 0–1.5)
BILIRUB UR QL STRIP: NEGATIVE
BUN BLD-MCNC: 15 MG/DL (ref 8–23)
BUN/CREAT SERPL: 11.9 (ref 7–25)
CALCIUM SPEC-SCNC: 11.1 MG/DL (ref 8.6–10.5)
CHLORIDE SERPL-SCNC: 101 MMOL/L (ref 98–107)
CLARITY UR: ABNORMAL
CO2 SERPL-SCNC: 25.8 MMOL/L (ref 22–29)
COD CRY URNS QL: ABNORMAL /HPF
COLOR UR: YELLOW
CREAT BLD-MCNC: 1.26 MG/DL (ref 0.57–1)
CREAT UR-MCNC: 237.4 MG/DL
DEPRECATED RDW RBC AUTO: 43.1 FL (ref 37–54)
EOSINOPHIL # BLD AUTO: 0.27 10*3/MM3 (ref 0–0.4)
EOSINOPHIL NFR BLD AUTO: 3.1 % (ref 0.3–6.2)
ERYTHROCYTE [DISTWIDTH] IN BLOOD BY AUTOMATED COUNT: 13.4 % (ref 12.3–15.4)
GFR SERPL CREATININE-BSD FRML MDRD: 42 ML/MIN/1.73
GLUCOSE BLD-MCNC: 103 MG/DL (ref 65–99)
GLUCOSE UR STRIP-MCNC: NEGATIVE MG/DL
HCT VFR BLD AUTO: 41.7 % (ref 34–46.6)
HGB BLD-MCNC: 13.7 G/DL (ref 12–15.9)
HGB UR QL STRIP.AUTO: NEGATIVE
HYALINE CASTS UR QL AUTO: ABNORMAL /LPF
IMM GRANULOCYTES # BLD AUTO: 0.02 10*3/MM3 (ref 0–0.05)
IMM GRANULOCYTES NFR BLD AUTO: 0.2 % (ref 0–0.5)
KETONES UR QL STRIP: ABNORMAL
LEUKOCYTE ESTERASE UR QL STRIP.AUTO: ABNORMAL
LYMPHOCYTES # BLD AUTO: 1.96 10*3/MM3 (ref 0.7–3.1)
LYMPHOCYTES NFR BLD AUTO: 22.3 % (ref 19.6–45.3)
MCH RBC QN AUTO: 29.1 PG (ref 26.6–33)
MCHC RBC AUTO-ENTMCNC: 32.9 G/DL (ref 31.5–35.7)
MCV RBC AUTO: 88.7 FL (ref 79–97)
MONOCYTES # BLD AUTO: 0.81 10*3/MM3 (ref 0.1–0.9)
MONOCYTES NFR BLD AUTO: 9.2 % (ref 5–12)
NEUTROPHILS # BLD AUTO: 5.67 10*3/MM3 (ref 1.7–7)
NEUTROPHILS NFR BLD AUTO: 64.7 % (ref 42.7–76)
NITRITE UR QL STRIP: NEGATIVE
NRBC BLD AUTO-RTO: 0 /100 WBC (ref 0–0.2)
PH UR STRIP.AUTO: 6.5 [PH] (ref 5–8)
PLATELET # BLD AUTO: 272 10*3/MM3 (ref 140–450)
PMV BLD AUTO: 11.1 FL (ref 6–12)
POTASSIUM BLD-SCNC: 4 MMOL/L (ref 3.5–5.2)
PROT UR QL STRIP: ABNORMAL
PROT UR-MCNC: 21 MG/DL
RBC # BLD AUTO: 4.7 10*6/MM3 (ref 3.77–5.28)
RBC # UR: ABNORMAL /HPF
REF LAB TEST METHOD: ABNORMAL
SODIUM BLD-SCNC: 141 MMOL/L (ref 136–145)
SODIUM UR-SCNC: 138 MMOL/L
SP GR UR STRIP: 1.03 (ref 1–1.03)
SQUAMOUS #/AREA URNS HPF: ABNORMAL /HPF
TRI-PHOS CRY URNS QL MICRO: ABNORMAL /HPF
UROBILINOGEN UR QL STRIP: ABNORMAL
WBC NRBC COR # BLD: 8.77 10*3/MM3 (ref 3.4–10.8)
WBC UR QL AUTO: ABNORMAL /HPF
YEAST URNS QL MICRO: ABNORMAL /HPF

## 2019-12-09 PROCEDURE — 36415 COLL VENOUS BLD VENIPUNCTURE: CPT

## 2019-12-09 PROCEDURE — 85025 COMPLETE CBC W/AUTO DIFF WBC: CPT | Performed by: INTERNAL MEDICINE

## 2019-12-09 PROCEDURE — 82570 ASSAY OF URINE CREATININE: CPT | Performed by: INTERNAL MEDICINE

## 2019-12-09 PROCEDURE — 81001 URINALYSIS AUTO W/SCOPE: CPT | Performed by: INTERNAL MEDICINE

## 2019-12-09 PROCEDURE — 84300 ASSAY OF URINE SODIUM: CPT | Performed by: INTERNAL MEDICINE

## 2019-12-09 PROCEDURE — 84156 ASSAY OF PROTEIN URINE: CPT | Performed by: INTERNAL MEDICINE

## 2019-12-09 PROCEDURE — 80048 BASIC METABOLIC PNL TOTAL CA: CPT | Performed by: INTERNAL MEDICINE

## 2019-12-09 PROCEDURE — 76775 US EXAM ABDO BACK WALL LIM: CPT

## 2020-01-13 ENCOUNTER — TRANSCRIBE ORDERS (OUTPATIENT)
Dept: ADMINISTRATIVE | Facility: HOSPITAL | Age: 70
End: 2020-01-13

## 2020-01-13 DIAGNOSIS — Z12.31 VISIT FOR SCREENING MAMMOGRAM: Primary | ICD-10-CM

## 2020-02-13 ENCOUNTER — HOSPITAL ENCOUNTER (OUTPATIENT)
Dept: MAMMOGRAPHY | Facility: HOSPITAL | Age: 70
Discharge: HOME | End: 2020-02-13
Admitting: FAMILY MEDICINE

## 2020-02-13 DIAGNOSIS — Z12.31 VISIT FOR SCREENING MAMMOGRAM: ICD-10-CM

## 2020-02-13 PROCEDURE — 77063 BREAST TOMOSYNTHESIS BI: CPT

## 2020-02-13 PROCEDURE — 77067 SCR MAMMO BI INCL CAD: CPT

## 2020-06-05 ENCOUNTER — LAB (OUTPATIENT)
Dept: LAB | Facility: HOSPITAL | Age: 70
End: 2020-06-05

## 2020-06-05 ENCOUNTER — TRANSCRIBE ORDERS (OUTPATIENT)
Dept: ADMINISTRATIVE | Facility: HOSPITAL | Age: 70
End: 2020-06-05

## 2020-06-05 DIAGNOSIS — N17.9 ACUTE RENAL FAILURE, UNSPECIFIED ACUTE RENAL FAILURE TYPE (HCC): ICD-10-CM

## 2020-06-05 DIAGNOSIS — N18.30 CHRONIC KIDNEY DISEASE, STAGE III (MODERATE) (HCC): Primary | ICD-10-CM

## 2020-06-05 DIAGNOSIS — N18.30 CHRONIC KIDNEY DISEASE, STAGE III (MODERATE) (HCC): ICD-10-CM

## 2020-06-05 DIAGNOSIS — R80.9 PROTEINURIA, UNSPECIFIED TYPE: ICD-10-CM

## 2020-06-05 LAB
ALBUMIN SERPL-MCNC: 4.2 G/DL (ref 3.5–5.2)
ALBUMIN/GLOB SERPL: 1.4 G/DL
ALP SERPL-CCNC: 65 U/L (ref 39–117)
ALT SERPL W P-5'-P-CCNC: 15 U/L (ref 1–33)
ANION GAP SERPL CALCULATED.3IONS-SCNC: 11.8 MMOL/L (ref 5–15)
AST SERPL-CCNC: 20 U/L (ref 1–32)
BACTERIA UR QL AUTO: ABNORMAL /HPF
BILIRUB SERPL-MCNC: 0.3 MG/DL (ref 0.2–1.2)
BILIRUB UR QL STRIP: NEGATIVE
BUN BLD-MCNC: 21 MG/DL (ref 8–23)
BUN/CREAT SERPL: 20 (ref 7–25)
CALCIUM SPEC-SCNC: 10 MG/DL (ref 8.6–10.5)
CHLORIDE SERPL-SCNC: 105 MMOL/L (ref 98–107)
CLARITY UR: CLEAR
CO2 SERPL-SCNC: 23.2 MMOL/L (ref 22–29)
COLOR UR: YELLOW
CREAT BLD-MCNC: 1.05 MG/DL (ref 0.57–1)
CREAT UR-MCNC: 173.8 MG/DL
GFR SERPL CREATININE-BSD FRML MDRD: 52 ML/MIN/1.73
GLOBULIN UR ELPH-MCNC: 2.9 GM/DL
GLUCOSE BLD-MCNC: 96 MG/DL (ref 65–99)
GLUCOSE UR STRIP-MCNC: NEGATIVE MG/DL
HGB UR QL STRIP.AUTO: NEGATIVE
HYALINE CASTS UR QL AUTO: ABNORMAL /LPF
KETONES UR QL STRIP: NEGATIVE
LEUKOCYTE ESTERASE UR QL STRIP.AUTO: ABNORMAL
NITRITE UR QL STRIP: NEGATIVE
PH UR STRIP.AUTO: 5.5 [PH] (ref 5–8)
POTASSIUM BLD-SCNC: 4.4 MMOL/L (ref 3.5–5.2)
PROT SERPL-MCNC: 7.1 G/DL (ref 6–8.5)
PROT UR QL STRIP: ABNORMAL
PROT UR-MCNC: 21 MG/DL
RBC # UR: ABNORMAL /HPF
REF LAB TEST METHOD: ABNORMAL
SODIUM BLD-SCNC: 140 MMOL/L (ref 136–145)
SP GR UR STRIP: >=1.03 (ref 1–1.03)
SQUAMOUS #/AREA URNS HPF: ABNORMAL /HPF
UROBILINOGEN UR QL STRIP: ABNORMAL
WBC UR QL AUTO: ABNORMAL /HPF

## 2020-06-05 PROCEDURE — 36415 COLL VENOUS BLD VENIPUNCTURE: CPT

## 2020-06-05 PROCEDURE — 82570 ASSAY OF URINE CREATININE: CPT | Performed by: INTERNAL MEDICINE

## 2020-06-05 PROCEDURE — 84156 ASSAY OF PROTEIN URINE: CPT | Performed by: INTERNAL MEDICINE

## 2020-06-05 PROCEDURE — 81001 URINALYSIS AUTO W/SCOPE: CPT | Performed by: INTERNAL MEDICINE

## 2020-06-05 PROCEDURE — 80053 COMPREHEN METABOLIC PANEL: CPT | Performed by: INTERNAL MEDICINE

## 2021-01-05 ENCOUNTER — TRANSCRIBE ORDERS (OUTPATIENT)
Dept: ADMINISTRATIVE | Facility: HOSPITAL | Age: 71
End: 2021-01-05

## 2021-01-05 DIAGNOSIS — Z12.31 SCREENING MAMMOGRAM, ENCOUNTER FOR: Primary | ICD-10-CM

## 2021-02-21 ENCOUNTER — IMMUNIZATION (OUTPATIENT)
Dept: VACCINE CLINIC | Facility: HOSPITAL | Age: 71
End: 2021-02-21

## 2021-02-21 PROCEDURE — 91300 HC SARSCOV02 VAC 30MCG/0.3ML IM: CPT | Performed by: INTERNAL MEDICINE

## 2021-02-21 PROCEDURE — 0001A: CPT | Performed by: INTERNAL MEDICINE

## 2021-03-14 ENCOUNTER — IMMUNIZATION (OUTPATIENT)
Dept: VACCINE CLINIC | Facility: HOSPITAL | Age: 71
End: 2021-03-14

## 2021-03-14 PROCEDURE — 91300 HC SARSCOV02 VAC 30MCG/0.3ML IM: CPT | Performed by: INTERNAL MEDICINE

## 2021-03-14 PROCEDURE — 0002A: CPT | Performed by: INTERNAL MEDICINE

## 2021-03-31 ENCOUNTER — APPOINTMENT (OUTPATIENT)
Dept: MAMMOGRAPHY | Facility: HOSPITAL | Age: 71
End: 2021-03-31

## 2021-05-25 ENCOUNTER — TRANSCRIBE ORDERS (OUTPATIENT)
Dept: ADMINISTRATIVE | Facility: HOSPITAL | Age: 71
End: 2021-05-25

## 2021-05-25 ENCOUNTER — HOSPITAL ENCOUNTER (OUTPATIENT)
Dept: MAMMOGRAPHY | Facility: HOSPITAL | Age: 71
Discharge: HOME | End: 2021-05-25
Admitting: FAMILY MEDICINE

## 2021-05-25 ENCOUNTER — LAB (OUTPATIENT)
Dept: LAB | Facility: HOSPITAL | Age: 71
End: 2021-05-25

## 2021-05-25 DIAGNOSIS — I13.10 MALIGNANT HYPERTENSIVE HEART AND CHRONIC KIDNEY DISEASE STAGE III (HCC): Primary | ICD-10-CM

## 2021-05-25 DIAGNOSIS — N18.6 ANEMIA IN END-STAGE RENAL DISEASE (HCC): ICD-10-CM

## 2021-05-25 DIAGNOSIS — N17.8 ACUTE RENAL FAILURE WITH PATHOLOGICAL LESION IN KIDNEY (HCC): ICD-10-CM

## 2021-05-25 DIAGNOSIS — N18.30 MALIGNANT HYPERTENSIVE HEART AND CHRONIC KIDNEY DISEASE STAGE III (HCC): ICD-10-CM

## 2021-05-25 DIAGNOSIS — D63.1 ANEMIA IN END-STAGE RENAL DISEASE (HCC): ICD-10-CM

## 2021-05-25 DIAGNOSIS — I13.10 MALIGNANT HYPERTENSIVE HEART AND CHRONIC KIDNEY DISEASE STAGE III (HCC): ICD-10-CM

## 2021-05-25 DIAGNOSIS — Z12.31 SCREENING MAMMOGRAM, ENCOUNTER FOR: ICD-10-CM

## 2021-05-25 DIAGNOSIS — N18.30 MALIGNANT HYPERTENSIVE HEART AND CHRONIC KIDNEY DISEASE STAGE III (HCC): Primary | ICD-10-CM

## 2021-05-25 LAB
25(OH)D3 SERPL-MCNC: 39 NG/ML (ref 30–100)
ALBUMIN SERPL-MCNC: 4.3 G/DL (ref 3.5–5.2)
ANION GAP SERPL CALCULATED.3IONS-SCNC: 13.2 MMOL/L (ref 5–15)
BUN SERPL-MCNC: 17 MG/DL (ref 8–23)
BUN/CREAT SERPL: 14.8 (ref 7–25)
CALCIUM SPEC-SCNC: 9.5 MG/DL (ref 8.6–10.5)
CHLORIDE SERPL-SCNC: 104 MMOL/L (ref 98–107)
CO2 SERPL-SCNC: 23.8 MMOL/L (ref 22–29)
CREAT SERPL-MCNC: 1.15 MG/DL (ref 0.57–1)
CREAT UR-MCNC: 401.8 MG/DL
GFR SERPL CREATININE-BSD FRML MDRD: 46 ML/MIN/1.73
GLUCOSE SERPL-MCNC: 97 MG/DL (ref 65–99)
PHOSPHATE SERPL-MCNC: 3.2 MG/DL (ref 2.5–4.5)
POTASSIUM SERPL-SCNC: 4.3 MMOL/L (ref 3.5–5.2)
PROT UR-MCNC: 62 MG/DL
PROT/CREAT UR: 154.3 MG/G CREA (ref 0–200)
SODIUM SERPL-SCNC: 141 MMOL/L (ref 136–145)

## 2021-05-25 PROCEDURE — 84156 ASSAY OF PROTEIN URINE: CPT

## 2021-05-25 PROCEDURE — 80069 RENAL FUNCTION PANEL: CPT

## 2021-05-25 PROCEDURE — 36415 COLL VENOUS BLD VENIPUNCTURE: CPT

## 2021-05-25 PROCEDURE — 82306 VITAMIN D 25 HYDROXY: CPT

## 2021-05-25 PROCEDURE — 82570 ASSAY OF URINE CREATININE: CPT

## 2021-05-25 PROCEDURE — 77067 SCR MAMMO BI INCL CAD: CPT

## 2021-05-25 PROCEDURE — 77063 BREAST TOMOSYNTHESIS BI: CPT

## 2022-04-11 ENCOUNTER — TRANSCRIBE ORDERS (OUTPATIENT)
Dept: ADMINISTRATIVE | Facility: HOSPITAL | Age: 72
End: 2022-04-11

## 2022-04-11 DIAGNOSIS — Z12.31 SCREENING MAMMOGRAM, ENCOUNTER FOR: Primary | ICD-10-CM

## 2022-06-01 ENCOUNTER — LAB (OUTPATIENT)
Dept: LAB | Facility: HOSPITAL | Age: 72
End: 2022-06-01

## 2022-06-01 ENCOUNTER — HOSPITAL ENCOUNTER (OUTPATIENT)
Dept: MAMMOGRAPHY | Facility: HOSPITAL | Age: 72
Discharge: HOME | End: 2022-06-01
Admitting: FAMILY MEDICINE

## 2022-06-01 ENCOUNTER — TRANSCRIBE ORDERS (OUTPATIENT)
Dept: ADMINISTRATIVE | Facility: HOSPITAL | Age: 72
End: 2022-06-01

## 2022-06-01 DIAGNOSIS — E55.9 VITAMIN D DEFICIENCY: ICD-10-CM

## 2022-06-01 DIAGNOSIS — D63.1 ANEMIA IN END-STAGE RENAL DISEASE: ICD-10-CM

## 2022-06-01 DIAGNOSIS — N18.6 ANEMIA IN END-STAGE RENAL DISEASE: ICD-10-CM

## 2022-06-01 DIAGNOSIS — N17.8 ACUTE RENAL FAILURE WITH PATHOLOGICAL LESION IN KIDNEY: ICD-10-CM

## 2022-06-01 DIAGNOSIS — N17.8 ACUTE RENAL FAILURE WITH PATHOLOGICAL LESION IN KIDNEY: Primary | ICD-10-CM

## 2022-06-01 DIAGNOSIS — E83.52 HYPERCALCEMIA: ICD-10-CM

## 2022-06-01 DIAGNOSIS — Z12.31 SCREENING MAMMOGRAM, ENCOUNTER FOR: ICD-10-CM

## 2022-06-01 LAB
25(OH)D3 SERPL-MCNC: 48 NG/ML (ref 30–100)
ALBUMIN SERPL-MCNC: 4.5 G/DL (ref 3.5–5.2)
ANION GAP SERPL CALCULATED.3IONS-SCNC: 16.2 MMOL/L (ref 5–15)
BACTERIA UR QL AUTO: NORMAL /HPF
BILIRUB UR QL STRIP: NEGATIVE
BUN SERPL-MCNC: 19 MG/DL (ref 8–23)
BUN/CREAT SERPL: 17.8 (ref 7–25)
CALCIUM SPEC-SCNC: 10.3 MG/DL (ref 8.6–10.5)
CHLORIDE SERPL-SCNC: 105 MMOL/L (ref 98–107)
CLARITY UR: ABNORMAL
CO2 SERPL-SCNC: 19.8 MMOL/L (ref 22–29)
COD CRY URNS QL: NORMAL /HPF
COLOR UR: YELLOW
CREAT SERPL-MCNC: 1.07 MG/DL (ref 0.57–1)
EGFRCR SERPLBLD CKD-EPI 2021: 55 ML/MIN/1.73
GLUCOSE SERPL-MCNC: 104 MG/DL (ref 65–99)
GLUCOSE UR STRIP-MCNC: NEGATIVE MG/DL
HGB UR QL STRIP.AUTO: NEGATIVE
HYALINE CASTS UR QL AUTO: NORMAL /LPF
KETONES UR QL STRIP: ABNORMAL
LEUKOCYTE ESTERASE UR QL STRIP.AUTO: ABNORMAL
NITRITE UR QL STRIP: NEGATIVE
PH UR STRIP.AUTO: 6 [PH] (ref 5–8)
PHOSPHATE SERPL-MCNC: 3.5 MG/DL (ref 2.5–4.5)
POTASSIUM SERPL-SCNC: 4.2 MMOL/L (ref 3.5–5.2)
PROT UR QL STRIP: ABNORMAL
RBC # UR STRIP: NORMAL /HPF
REF LAB TEST METHOD: NORMAL
SODIUM SERPL-SCNC: 141 MMOL/L (ref 136–145)
SP GR UR STRIP: 1.03 (ref 1–1.03)
SQUAMOUS #/AREA URNS HPF: NORMAL /HPF
UROBILINOGEN UR QL STRIP: ABNORMAL
WBC # UR STRIP: NORMAL /HPF

## 2022-06-01 PROCEDURE — 82306 VITAMIN D 25 HYDROXY: CPT

## 2022-06-01 PROCEDURE — 80069 RENAL FUNCTION PANEL: CPT

## 2022-06-01 PROCEDURE — 36415 COLL VENOUS BLD VENIPUNCTURE: CPT

## 2022-06-01 PROCEDURE — 77067 SCR MAMMO BI INCL CAD: CPT

## 2022-06-01 PROCEDURE — 77063 BREAST TOMOSYNTHESIS BI: CPT

## 2022-06-01 PROCEDURE — 81001 URINALYSIS AUTO W/SCOPE: CPT

## 2022-09-29 ENCOUNTER — TELEPHONE (OUTPATIENT)
Dept: FAMILY MEDICINE CLINIC | Facility: CLINIC | Age: 72
End: 2022-09-29

## 2022-09-29 NOTE — TELEPHONE ENCOUNTER
OK FOR HUB TO READ & RESCHEDULE WITH ANY PROVIDER    Your appointment on 10/11/22 with Dr. Stover  needs to be rescheduled. Please call our office at [phone] to reschedule with your provider or any other provider within our group. We're sorry for any inconvenience this causes.

## 2023-05-05 ENCOUNTER — TRANSCRIBE ORDERS (OUTPATIENT)
Dept: ADMINISTRATIVE | Facility: HOSPITAL | Age: 73
End: 2023-05-05
Payer: MEDICARE

## 2023-05-05 DIAGNOSIS — Z12.39 SCREENING BREAST EXAMINATION: Primary | ICD-10-CM

## 2023-06-12 ENCOUNTER — HOSPITAL ENCOUNTER (OUTPATIENT)
Dept: MAMMOGRAPHY | Facility: HOSPITAL | Age: 73
Discharge: HOME | End: 2023-06-12
Admitting: FAMILY MEDICINE
Payer: MEDICARE

## 2023-06-12 DIAGNOSIS — Z12.39 SCREENING BREAST EXAMINATION: ICD-10-CM

## 2023-06-12 PROCEDURE — 77067 SCR MAMMO BI INCL CAD: CPT

## 2023-06-12 PROCEDURE — 77063 BREAST TOMOSYNTHESIS BI: CPT

## 2023-08-29 ENCOUNTER — TRANSCRIBE ORDERS (OUTPATIENT)
Dept: ADMINISTRATIVE | Facility: HOSPITAL | Age: 73
End: 2023-08-29
Payer: MEDICARE

## 2023-08-29 ENCOUNTER — LAB (OUTPATIENT)
Dept: LAB | Facility: HOSPITAL | Age: 73
End: 2023-08-29
Payer: MEDICARE

## 2023-08-29 DIAGNOSIS — Z00.00 WELLNESS EXAMINATION: ICD-10-CM

## 2023-08-29 DIAGNOSIS — Z00.00 WELLNESS EXAMINATION: Primary | ICD-10-CM

## 2023-08-29 LAB
ALBUMIN SERPL-MCNC: 4.5 G/DL (ref 3.5–5.2)
ALBUMIN/GLOB SERPL: 1.7 G/DL
ALP SERPL-CCNC: 70 U/L (ref 39–117)
ALT SERPL W P-5'-P-CCNC: 10 U/L (ref 1–33)
ANION GAP SERPL CALCULATED.3IONS-SCNC: 15 MMOL/L (ref 5–15)
AST SERPL-CCNC: 19 U/L (ref 1–32)
BASOPHILS # BLD AUTO: 0.05 10*3/MM3 (ref 0–0.2)
BASOPHILS NFR BLD AUTO: 0.6 % (ref 0–1.5)
BILIRUB SERPL-MCNC: 0.3 MG/DL (ref 0–1.2)
BUN SERPL-MCNC: 14 MG/DL (ref 8–23)
BUN/CREAT SERPL: 14.6 (ref 7–25)
CALCIUM SPEC-SCNC: 10.7 MG/DL (ref 8.6–10.5)
CHLORIDE SERPL-SCNC: 103 MMOL/L (ref 98–107)
CHOLEST SERPL-MCNC: 188 MG/DL (ref 0–200)
CO2 SERPL-SCNC: 23 MMOL/L (ref 22–29)
CREAT SERPL-MCNC: 0.96 MG/DL (ref 0.57–1)
DEPRECATED RDW RBC AUTO: 44.3 FL (ref 37–54)
EGFRCR SERPLBLD CKD-EPI 2021: 61.8 ML/MIN/1.73
EOSINOPHIL # BLD AUTO: 0.18 10*3/MM3 (ref 0–0.4)
EOSINOPHIL NFR BLD AUTO: 2.2 % (ref 0.3–6.2)
ERYTHROCYTE [DISTWIDTH] IN BLOOD BY AUTOMATED COUNT: 14.3 % (ref 12.3–15.4)
GLOBULIN UR ELPH-MCNC: 2.6 GM/DL
GLUCOSE SERPL-MCNC: 105 MG/DL (ref 65–99)
HCT VFR BLD AUTO: 41.3 % (ref 34–46.6)
HDLC SERPL-MCNC: 81 MG/DL (ref 40–60)
HGB BLD-MCNC: 13.8 G/DL (ref 12–15.9)
IMM GRANULOCYTES # BLD AUTO: 0.03 10*3/MM3 (ref 0–0.05)
IMM GRANULOCYTES NFR BLD AUTO: 0.4 % (ref 0–0.5)
LDLC SERPL CALC-MCNC: 91 MG/DL (ref 0–100)
LDLC/HDLC SERPL: 1.1 {RATIO}
LYMPHOCYTES # BLD AUTO: 1.65 10*3/MM3 (ref 0.7–3.1)
LYMPHOCYTES NFR BLD AUTO: 20.2 % (ref 19.6–45.3)
MCH RBC QN AUTO: 28.5 PG (ref 26.6–33)
MCHC RBC AUTO-ENTMCNC: 33.4 G/DL (ref 31.5–35.7)
MCV RBC AUTO: 85.2 FL (ref 79–97)
MONOCYTES # BLD AUTO: 0.63 10*3/MM3 (ref 0.1–0.9)
MONOCYTES NFR BLD AUTO: 7.7 % (ref 5–12)
NEUTROPHILS NFR BLD AUTO: 5.63 10*3/MM3 (ref 1.7–7)
NEUTROPHILS NFR BLD AUTO: 68.9 % (ref 42.7–76)
NRBC BLD AUTO-RTO: 0 /100 WBC (ref 0–0.2)
PLATELET # BLD AUTO: 274 10*3/MM3 (ref 140–450)
PMV BLD AUTO: 11.7 FL (ref 6–12)
POTASSIUM SERPL-SCNC: 4.1 MMOL/L (ref 3.5–5.2)
PROT SERPL-MCNC: 7.1 G/DL (ref 6–8.5)
RBC # BLD AUTO: 4.85 10*6/MM3 (ref 3.77–5.28)
SODIUM SERPL-SCNC: 141 MMOL/L (ref 136–145)
TRIGL SERPL-MCNC: 88 MG/DL (ref 0–150)
VLDLC SERPL-MCNC: 16 MG/DL (ref 5–40)
WBC NRBC COR # BLD: 8.17 10*3/MM3 (ref 3.4–10.8)

## 2023-08-29 PROCEDURE — 85025 COMPLETE CBC W/AUTO DIFF WBC: CPT

## 2023-08-29 PROCEDURE — 80061 LIPID PANEL: CPT

## 2023-08-29 PROCEDURE — 36415 COLL VENOUS BLD VENIPUNCTURE: CPT

## 2023-08-29 PROCEDURE — 80053 COMPREHEN METABOLIC PANEL: CPT

## 2024-05-07 ENCOUNTER — TRANSCRIBE ORDERS (OUTPATIENT)
Dept: ADMINISTRATIVE | Facility: HOSPITAL | Age: 74
End: 2024-05-07
Payer: MEDICARE

## 2024-05-07 DIAGNOSIS — Z12.31 SCREENING MAMMOGRAM FOR BREAST CANCER: Primary | ICD-10-CM

## 2024-08-12 ENCOUNTER — HOSPITAL ENCOUNTER (OUTPATIENT)
Dept: MAMMOGRAPHY | Facility: HOSPITAL | Age: 74
Discharge: HOME | End: 2024-08-12
Admitting: FAMILY MEDICINE
Payer: MEDICARE

## 2024-08-12 DIAGNOSIS — Z12.31 SCREENING MAMMOGRAM FOR BREAST CANCER: ICD-10-CM

## 2024-08-12 PROCEDURE — 77067 SCR MAMMO BI INCL CAD: CPT

## 2024-08-12 PROCEDURE — 77063 BREAST TOMOSYNTHESIS BI: CPT

## 2025-01-02 ENCOUNTER — HOSPITAL ENCOUNTER (OUTPATIENT)
Facility: HOSPITAL | Age: 75
Discharge: HOME | End: 2025-01-02
Attending: EMERGENCY MEDICINE
Payer: MEDICARE

## 2025-01-02 ENCOUNTER — APPOINTMENT (OUTPATIENT)
Dept: GENERAL RADIOLOGY | Facility: HOSPITAL | Age: 75
End: 2025-01-02
Payer: MEDICARE

## 2025-01-02 VITALS
HEIGHT: 63 IN | SYSTOLIC BLOOD PRESSURE: 178 MMHG | OXYGEN SATURATION: 96 % | DIASTOLIC BLOOD PRESSURE: 83 MMHG | RESPIRATION RATE: 18 BRPM | HEART RATE: 104 BPM | WEIGHT: 167 LBS | BODY MASS INDEX: 29.59 KG/M2 | TEMPERATURE: 96.8 F

## 2025-01-02 DIAGNOSIS — S60.052A CONTUSION OF LEFT LITTLE FINGER WITHOUT DAMAGE TO NAIL, INITIAL ENCOUNTER: ICD-10-CM

## 2025-01-02 DIAGNOSIS — T14.8XXA HEMATOMA AND CONTUSION: Primary | ICD-10-CM

## 2025-01-02 PROCEDURE — 90471 IMMUNIZATION ADMIN: CPT | Performed by: EMERGENCY MEDICINE

## 2025-01-02 PROCEDURE — 90715 TDAP VACCINE 7 YRS/> IM: CPT | Performed by: EMERGENCY MEDICINE

## 2025-01-02 PROCEDURE — 99204 OFFICE O/P NEW MOD 45 MIN: CPT | Performed by: EMERGENCY MEDICINE

## 2025-01-02 PROCEDURE — 73130 X-RAY EXAM OF HAND: CPT

## 2025-01-02 PROCEDURE — G0463 HOSPITAL OUTPT CLINIC VISIT: HCPCS | Performed by: EMERGENCY MEDICINE

## 2025-01-02 PROCEDURE — 73590 X-RAY EXAM OF LOWER LEG: CPT

## 2025-01-02 PROCEDURE — 25010000002 TETANUS-DIPHTH-ACELL PERTUSSIS 5-2.5-18.5 LF-MCG/0.5 SUSPENSION PREFILLED SYRINGE: Performed by: EMERGENCY MEDICINE

## 2025-01-02 PROCEDURE — 63710000001 ONDANSETRON ODT 4 MG TABLET DISPERSIBLE: Performed by: EMERGENCY MEDICINE

## 2025-01-02 RX ORDER — ONDANSETRON 4 MG/1
4 TABLET, ORALLY DISINTEGRATING ORAL ONCE
Status: COMPLETED | OUTPATIENT
Start: 2025-01-02 | End: 2025-01-02

## 2025-01-02 RX ORDER — MIRABEGRON 25 MG/1
25 TABLET, FILM COATED, EXTENDED RELEASE ORAL DAILY
COMMUNITY

## 2025-01-02 RX ORDER — HYDROCODONE BITARTRATE AND ACETAMINOPHEN 5; 325 MG/1; MG/1
1 TABLET ORAL ONCE AS NEEDED
Status: DISCONTINUED | OUTPATIENT
Start: 2025-01-02 | End: 2025-01-02 | Stop reason: HOSPADM

## 2025-01-02 RX ORDER — CEPHALEXIN 500 MG/1
500 CAPSULE ORAL ONCE
Status: COMPLETED | OUTPATIENT
Start: 2025-01-02 | End: 2025-01-02

## 2025-01-02 RX ORDER — HYDROCODONE BITARTRATE AND ACETAMINOPHEN 5; 325 MG/1; MG/1
1 TABLET ORAL EVERY 6 HOURS PRN
Qty: 10 TABLET | Refills: 0 | Status: SHIPPED | OUTPATIENT
Start: 2025-01-02 | End: 2025-01-05

## 2025-01-02 RX ORDER — CEPHALEXIN 500 MG/1
500 CAPSULE ORAL 3 TIMES DAILY
Qty: 15 CAPSULE | Refills: 0 | Status: SHIPPED | OUTPATIENT
Start: 2025-01-02 | End: 2025-01-07

## 2025-01-02 RX ADMIN — HYDROCODONE BITARTRATE AND ACETAMINOPHEN 1 TABLET: 5; 325 TABLET ORAL at 19:21

## 2025-01-02 RX ADMIN — ONDANSETRON 4 MG: 4 TABLET, ORALLY DISINTEGRATING ORAL at 19:21

## 2025-01-02 RX ADMIN — TETANUS TOXOID, REDUCED DIPHTHERIA TOXOID AND ACELLULAR PERTUSSIS VACCINE, ADSORBED 0.5 ML: 5; 2.5; 8; 8; 2.5 SUSPENSION INTRAMUSCULAR at 19:21

## 2025-01-02 RX ADMIN — CEPHALEXIN 500 MG: 500 CAPSULE ORAL at 19:21

## 2025-01-03 NOTE — FSED PROVIDER NOTE
"        EMERGENCY DEPARTMENT ENCOUNTER    Room Number:  08/08  Date seen:  1/3/2025  Time seen: 19:21 EST  PCP: Reyes, Miriam Mercado, MD  Historian:     Discussed/obtained information from independent historians:     HPI:  Patient is a very nice 74-year-old female.  She presents with right leg injury left hand injury.  She fell at the [employer] shop today.  She did strike her face but she did not lose consciousness.  Not anticoagulated.  She did strike her right medial mid calf area on the chair and did sustain a slight abrasion as well as a deep hematoma.  Last tetanus is greater than 5 years.  She also did injure her left fifth digit.  She does have a history of trigger finger to this digit in the past.      External (non-ED) record review:        Chronic or social conditions impacting care:    ALLERGIES  Gluten meal    PAST MEDICAL HISTORY  Active Ambulatory Problems     Diagnosis Date Noted    Iron deficiency anemia 08/22/2018    Leukocytosis 10/01/2018    Adverse effect of preparation of iron compound 03/20/2019     Resolved Ambulatory Problems     Diagnosis Date Noted    No Resolved Ambulatory Problems     Past Medical History:   Diagnosis Date    Arthritis     Bipolar disorder     Bladder spasms     H/O Heartburn     History of anemia     History of aspiration pneumonia 03/2018    Rosacea     Skin cancer     Tattoos        PAST SURGICAL HISTORY  Past Surgical History:   Procedure Laterality Date    AUGMENTATION MAMMAPLASTY  2000    BREAST EXCISIONAL BIOPSY Right 1979    benign    BREAST SURGERY      Benign cyst    FOOT SURGERY      Over 20 years ago.    OTHER SURGICAL HISTORY      \"Surgery for stopping menstruation\" over 30 years ago.    TUBAL ABDOMINAL LIGATION      Over 40 years ago.       FAMILY HISTORY  Family History   Problem Relation Age of Onset    Pancreatic cancer Mother 81    Heart disease Mother     Diabetes Mother     Cancer Father 67        Lymph nodes    Mental illness Father     Hypertension " Father     Breast cancer Maternal Grandmother         90's    Colon cancer Neg Hx     Ovarian cancer Neg Hx        SOCIAL HISTORY  Social History     Socioeconomic History    Marital status:      Spouse name: Hesham    Number of children: 1    Years of education: High School   Tobacco Use    Smoking status: Former     Current packs/day: 0.00     Average packs/day: 0.5 packs/day for 25.0 years (12.5 ttl pk-yrs)     Types: Cigarettes     Start date: 1985     Quit date: 2010     Years since quittin.3    Smokeless tobacco: Never   Substance and Sexual Activity    Alcohol use: Yes     Alcohol/week: 3.0 - 4.0 standard drinks of alcohol     Types: 3 - 4 Cans of beer per week     Comment: Seldom    Drug use: No    Sexual activity: Defer       REVIEW OF SYSTEMS  Review of Systems    All systems reviewed and negative except for those discussed in HPI.       PHYSICAL EXAM    I have reviewed the triage vital signs and nursing notes.  Vitals:    25 1738   BP: 178/83   Pulse: 104   Resp: 18   Temp: 96.8 °F (36 °C)   SpO2: 96%     Physical Exam  Vital signs: Reviewed in nurses notes    General: Awake alert no acute distress    HEENT: Normocephalic atraumatic pupils are equal round sponsor      Neck:   Nontender to palpation    Musculoskeletal:    The right lower leg reveals a superficial abrasion to the medial aspect of the mid calf region of the right lower leg.  There is a significant hematoma noted on the muscle of the calf.  No active bleeding, no foreign body.  Full range of motion both at the knee and ankle.  DP PT are 2+ and equal    Left hand reveals some bruising on the volar aspect of the fifth digit over lying the PIP and DIP joints.  There is still full range of motion at the MCP PIP and DIP joints.    Abdomen: Nondistended        Neurological examination: Patient is awake alert oriented x4.  Speech is normal.  No facial palsy.  No focal motor or sensory deficits.    LAB RESULTS  No results  found for this or any previous visit (from the past 24 hours).    Ordered the above labs and independently interpreted results.  My findings will be discussed in the ED course or medical decision making section below      PROCEDURES:  Procedures      RADIOLOGY RESULTS  XR Tibia Fibula 2 View Right, XR Hand 3+ View Left    Result Date: 1/2/2025  XR TIBIA FIBULA 2 VW RIGHT-, XR HAND 3+ VW LEFT-  Clinical: Lower extremity injury, left hand injury  Right tibia/fibula findings: There is soft tissue swelling along the anterior calf, no radiopaque foreign body or soft tissue gas is demonstrated. No underlying osseous abnormality of the tibia or fibula seen. The remainder of the examination is unremarkable.  Left hand findings: No fracture or dislocation is demonstrated. There is considerable joint degeneration most pronounced at the first carpometacarpal articulation. [place]-neck deformity of the small finger. No soft tissue gas no radiopaque foreign body seen. The remainder is unremarkable.  This report was finalized on 1/2/2025 6:20 PM by Dr. Ajit Blanco M.D on Workstation: [id number]        Ordered the above noted radiological studies.  Independently interpreted by me.  My findings will be discussed in the medical decision section below.     PROGRESS, DATA ANALYSIS, CONSULTS AND MEDICAL DECISION MAKING    Please note that this section constitutes my independent interpretation of clinical data including lab results, radiology, EKG's.  This constitutes my independent professional opinion regarding differential diagnosis and management of this patient.  It may include any factors such as history from outside sources, review of external records, social determinants of health, management of medications, response to those treatments, and discussions with other providers.       Orders placed during this visit:  Orders Placed This Encounter   Procedures    XR Tibia Fibula 2 View Right    XR Hand 3+ View Left        Medications   Tetanus-Diphth-Acell Pertussis (BOOSTRIX) injection 0.5 mL (0.5 mL Intramuscular Given 1/2/25 1921)   cephalexin (KEFLEX) capsule 500 mg (500 mg Oral Given 1/2/25 1921)   ondansetron ODT (ZOFRAN-ODT) disintegrating tablet 4 mg (4 mg Oral Given 1/2/25 1921)     Procedure: The abrasion on the right mid calf was cleansed with wound cleanser.  Thereafter applied triple antibiotic ointment followed by a sterile bandage.  I likewise wrapped the calf area with a 4 inch Ace bandage.  The right lower extremity is neurovascularly intact after procedure       Medical Decision Making          DIAGNOSIS  Final diagnoses:   Hematoma and contusion   Contusion of left little finger without damage to nail, initial encounter          Medication List        New Prescriptions      cephalexin 500 MG capsule  Commonly known as: KEFLEX  Take 1 capsule by mouth 3 (Three) Times a Day for 5 days.            Changed      * HYDROcodone-acetaminophen 5-325 MG per tablet  Commonly known as: NORCO  What changed: Another medication with the same name was added. Make sure you understand how and when to take each.     * HYDROcodone-acetaminophen 5-325 MG per tablet  Commonly known as: NORCO  Take 1 tablet by mouth Every 6 (Six) Hours As Needed for Moderate Pain for up to 3 days.  What changed: You were already taking a medication with the same name, and this prescription was added. Make sure you understand how and when to take each.           * This list has 2 medication(s) that are the same as other medications prescribed for you. Read the directions carefully, and ask your doctor or other care provider to review them with you.                   Where to Get Your Medications        These medications were sent to [id number] - [place], [place] - [address] - [phone]  - [phone]   [address] [place] [address]      Phone: [phone]   cephalexin 500 MG capsule  HYDROcodone-acetaminophen  5-325 MG per tablet         FOLLOW-UP  Reyes, Miriam Mercado, MD  [address] [address]  [phone]    In 1 week          Latest Documented Vital Signs:  As of 01:35 EST  BP- 178/83 HR- 104 Temp- 96.8 °F (36 °C) (Oral) O2 sat- 96%    Appropriate PPE utilized throughout this patient encounter to include mask, if indicated, per current protocol. Hand hygiene was performed before donning PPE and after removal when leaving the room.    Please note that portions of this were completed with a voice recognition program.     Note Disclaimer: At [facility], we believe that sharing information builds trust and better relationships. You are receiving this note because you are receiving care at [facility] or recently visited. It is possible you will see health information before a provider has talked with you about it. This kind of information can be easy to misunderstand. To help you fully understand what it means for your health, we urge you to discuss this note with your provider.

## 2025-01-03 NOTE — DISCHARGE INSTRUCTIONS
Today there is no evidence of bony fracture on your right tibial area or on your left hand.    You certainly do have a deep hematoma to the muscle of the right calf.    Please gently cleanse the area of the abrasion daily.  I did put you on a antibiotic for 5 days to help prevent infection.    I would also like you to continue the Ace wrap for mild compression.  I will be also important to elevate your leg is much as possible the next several days.    Additionally you can utilize ice packs for 15 to 20 minutes every 2-3 hours while awake    I would go ahead and touch base with the orthopedist next week to let them know that you have a abrasion and contusion to the right calf.    Return for worsening signs or symptoms    Did send some antibiotics and pain control to your pharmacy to  tomorrow.  You did receive your first dose here tonight    Please read all of the instructions in this handout.  If you receive prescriptions please fill them and take them as directed.  Please call your primary care physician for follow-up appointment in the next 5 to 7 days.  If you do not have a physician you may call the [facility] referral line at [phone].    You may return to the emergency department at any time for any concerns such as worsening symptoms.  If you received a work or school note it will be printed at the back of this packet.

## 2025-07-01 ENCOUNTER — TRANSCRIBE ORDERS (OUTPATIENT)
Dept: ADMINISTRATIVE | Facility: HOSPITAL | Age: 75
End: 2025-07-01
Payer: MEDICARE

## 2025-07-01 DIAGNOSIS — Z12.31 BREAST CANCER SCREENING BY MAMMOGRAM: Primary | ICD-10-CM

## 2025-08-20 ENCOUNTER — HOSPITAL ENCOUNTER (OUTPATIENT)
Dept: MAMMOGRAPHY | Facility: HOSPITAL | Age: 75
Discharge: HOME | End: 2025-08-20
Admitting: FAMILY MEDICINE
Payer: MEDICARE

## 2025-08-20 DIAGNOSIS — Z12.31 BREAST CANCER SCREENING BY MAMMOGRAM: ICD-10-CM

## 2025-08-20 PROCEDURE — 77067 SCR MAMMO BI INCL CAD: CPT

## 2025-08-20 PROCEDURE — 77063 BREAST TOMOSYNTHESIS BI: CPT
